# Patient Record
Sex: FEMALE | Race: WHITE | URBAN - METROPOLITAN AREA
[De-identification: names, ages, dates, MRNs, and addresses within clinical notes are randomized per-mention and may not be internally consistent; named-entity substitution may affect disease eponyms.]

---

## 2017-01-05 ENCOUNTER — OFFICE VISIT (OUTPATIENT)
Dept: OPHTHALMOLOGY | Facility: CLINIC | Age: 77
End: 2017-01-05

## 2017-01-05 DIAGNOSIS — H52.13 MYOPIA, BILATERAL: ICD-10-CM

## 2017-01-05 DIAGNOSIS — H35.369 DRUSEN (DEGENERATIVE) OF RETINA, UNSPECIFIED LATERALITY: Primary | ICD-10-CM

## 2017-01-05 ASSESSMENT — VISUAL ACUITY
OD_CC: 20/15-3
OS_CC: 20/20
CORRECTION_TYPE: GLASSES
METHOD: SNELLEN - LINEAR

## 2017-01-05 ASSESSMENT — REFRACTION_MANIFEST
OD_SPHERE: -0.50
OS_ADD: +2.50
OD_ADD: +2.50
OS_SPHERE: -0.75
OS_AXIS: 010
OS_CYLINDER: +0.50
OD_CYLINDER: SPHERE

## 2017-01-05 ASSESSMENT — REFRACTION_WEARINGRX
OD_ADD: +2.50
OD_CYLINDER: +0.25
OS_ADD: +2.50
OD_AXIS: 090
OD_SPHERE: -0.75
OS_AXIS: 135
OS_SPHERE: -0.75
OS_CYLINDER: +0.25
SPECS_TYPE: PAL

## 2017-01-05 ASSESSMENT — SLIT LAMP EXAM - LIDS
COMMENTS: NORMAL
COMMENTS: NORMAL

## 2017-01-05 ASSESSMENT — CUP TO DISC RATIO
OD_RATIO: 0.35
OS_RATIO: 0.35

## 2017-01-05 ASSESSMENT — TONOMETRY
IOP_METHOD: ICARE
OS_IOP_MMHG: 18
OD_IOP_MMHG: 16

## 2017-01-05 ASSESSMENT — CONF VISUAL FIELD
METHOD: COUNTING FINGERS
OS_NORMAL: 1
OD_NORMAL: 1

## 2017-01-05 ASSESSMENT — EXTERNAL EXAM - RIGHT EYE: OD_EXAM: NORMAL

## 2017-01-05 ASSESSMENT — EXTERNAL EXAM - LEFT EYE: OS_EXAM: NORMAL

## 2017-01-05 NOTE — NURSING NOTE
Chief Complaints and History of Present Illnesses   Patient presents with     COMPREHENSIVE EYE EXAM     HPI    Affected eye(s):  Both   Symptoms:     Blurred vision (Comment: unsure if blurred vision is one eye or both)   Floaters (Comment: both eyes - increased)   No flashes      Duration:  1 year   Frequency:  Constant, Intermittent       Do you have eye pain now?:  No      Comments:  MAGDA Huffman 10:52 AM 01/05/2017

## 2017-01-05 NOTE — MR AVS SNAPSHOT
After Visit Summary   1/5/2017    Jennifer Champion    MRN: [id number]           Patient Information     Date Of Birth          1940        Visit Information        Provider Department      1/5/2017 10:40 AM Luís Arnold MD [facility]        Today's Diagnoses     Drusen (degenerative) of retina, unspecified laterality - Both Eyes    -  1     Myopia, bilateral            Follow-ups after your visit        Follow-up notes from your care team     Return in about 1 year (around 1/5/2018) for Complete Eye Exam.      Your next 10 appointments already scheduled     Jan 11, 2017 10:10 AM   JOSE Extremity with Leana Reed PT   [facility] ([facility]  )    [address]  [facility] [address] [phone]            Jan 12, 2017  1:15 PM   (Arrive by 1:00 PM)   NEW HIP with Odilon Wu MD   [facility] ([facility])    [address] [place] [address] [phone]              Who to contact     Please call your clinic at [phone] to:    Ask questions about your health    Make or cancel appointments    Discuss your medicines    Learn about your test results    Speak to your doctor   If you have compliments or concerns about an experience at your clinic, or if you wish to file a complaint, please contact [facility] Patient Relations at [phone] or email us at [email or web address]         Additional Information About Your Visit        MyChart Information     Screenhero is an electronic gateway that provides easy, online access to your medical records. With Screenhero, you can request a clinic appointment, read your test results, renew a prescription or communicate with your care team.     To sign up for [employer] visit the website at [email or web address]   You will be asked to enter the  access code listed below, as well as some personal information. Please follow the directions to create your username and password.     Your access code is: [email or web address]  Expires: 3/22/2017  2:29 PM     Your access code will  in 90 days. If you need help or a new code, please contact your [facility] or call [phone] for assistance.        Care EveryWhere ID     This is your Care EveryWhere ID. This could be used by other organizations to access your [place] medical records  [id number]         Blood Pressure from Last 3 Encounters:   12 117/74    Weight from Last 3 Encounters:   No data found for Wt              Today, you had the following     No orders found for display       Primary Care Provider Office Phone # Fax #    Lizeth CASTILLO Camp [phone]       [facility] [address] [place] [id number]        Thank you!     Thank you for choosing [facility]  for your care. Our goal is always to provide you with excellent care. Hearing back from our patients is one way we can continue to improve our services. Please take a few minutes to complete the written survey that you may receive in the mail after your visit with us. Thank you!             Your Updated Medication List - Protect others around you: Learn how to safely use, store and throw away your medicines at www.disposemymeds.org.          This list is accurate as of: 17 11:57 AM.  Always use your most recent med list.                   Brand Name Dispense Instructions for use    acetaminophen 500 MG tablet    TYLENOL    100 tablet    Take 2 tablets by mouth 3 times daily.       ALEVE PO      Take by mouth as needed       LORazepam 0.5 MG tablet    ATIVAN     Take 0.5 mg by mouth At Bedtime       traMADol 50 MG tablet    ULTRAM    30 tablet    Take 1 tablet by mouth every 6 hours as needed.       TRAZODONE HCL PO      Take 6.25 mg by mouth nightly as needed.  Takes 1/4 or 25 mg tablet qHS PRN for sleep.       ZOLOFT PO      Take 50 mg by mouth daily

## 2017-01-05 NOTE — PROGRESS NOTES
Assessment & Plan      Jennifer Champion is a 76 year old female with the following diagnoses:   (H35.369) Drusen (degenerative) of retina, unspecified laterality - Both Eyes  (primary encounter diagnosis)  Comment: Mild  Plan: Follow, AREDS-2 q day    (H52.13) Myopia, bilateral  Comment: Little change  Plan: Rx     -----------------------------------------------------------------------------------      Patient disposition:   Return in about 1 year (around 1/5/2018) for Complete Eye Exam. or sooner as needed.    I have confirmed the content of the chief complaint, history of present illness, review of systems, and past medical/surgical history sections and edited the information as needed.

## 2017-01-12 ENCOUNTER — OFFICE VISIT (OUTPATIENT)
Dept: ORTHOPEDICS | Facility: CLINIC | Age: 77
End: 2017-01-12

## 2017-01-12 VITALS — BODY MASS INDEX: 26.48 KG/M2 | HEIGHT: 67 IN | WEIGHT: 168.7 LBS

## 2017-01-12 DIAGNOSIS — T56.894A TOXIC EFFECT OF OTHER METALS, UNDETERMINED, INITIAL ENCOUNTER: ICD-10-CM

## 2017-01-12 DIAGNOSIS — T84.84XA PAIN DUE TO HIP JOINT PROSTHESIS, INITIAL ENCOUNTER (H): ICD-10-CM

## 2017-01-12 DIAGNOSIS — T56.2X4A TOXIC EFFECT OF CHROMIUM AND ITS COMPOUNDS, UNDETERMINED, INITIAL ENCOUNTER: ICD-10-CM

## 2017-01-12 DIAGNOSIS — T84.84XA PAIN DUE TO HIP JOINT PROSTHESIS, INITIAL ENCOUNTER (H): Primary | ICD-10-CM

## 2017-01-12 DIAGNOSIS — Z96.649 PAIN DUE TO HIP JOINT PROSTHESIS, INITIAL ENCOUNTER (H): Primary | ICD-10-CM

## 2017-01-12 DIAGNOSIS — Z96.649 PAIN DUE TO HIP JOINT PROSTHESIS, INITIAL ENCOUNTER (H): ICD-10-CM

## 2017-01-12 LAB
CRP SERPL-MCNC: <2.9 MG/L (ref 0–8)
ERYTHROCYTE [SEDIMENTATION RATE] IN BLOOD BY WESTERGREN METHOD: 7 MM/H (ref 0–30)

## 2017-01-12 RX ORDER — AMOXICILLIN 500 MG/1
CAPSULE ORAL
COMMUNITY
Start: 2016-05-26

## 2017-01-12 RX ORDER — FLUTICASONE PROPIONATE 50 MCG
1 SPRAY, SUSPENSION (ML) NASAL
COMMUNITY
Start: 2016-12-19

## 2017-01-12 NOTE — Clinical Note
1/12/2017       RE: Jennifer Champion  [address]  [facility] [address]     Dear Colleague,    Thank you for referring your patient, Jennifer Champion, to the [facility] at [facility]. Please see a copy of my visit note below.    [facility], Orthopaedic Surgery Consultation  by Odilon Wu M.D.    Jennifer Champion MRN# [id number]   Age: 76 year old YOB: 1940     Requesting physician: Jaleel Crawford            Assessment and Plan:   Assessment:  Continued pain s/p R ASHLEY. Unclear etiology. Possibilities include indolent infection, loosening, abductor weakness/tear. Profoundly weak RLE abductors     Plan:  1. Inflammatory labs today. Will obtain Co/Cr/Ti serum levels to assess for trunion wear in the setting of large head on the right side  2. Patient will obtain prior XR and bone scans from [facility] for review  3. If inflammatory labs are elevated, will send for fluoro guided aspiration of the right hip. Aerobic/anaerobic/gram/cell/alpha-defensin  4. Follow up in clinic after workup           History of Present Illness:   76 year old female with multiply revised bilateral total hip arthroplasty. She is referred for her painful right total hip. She most recently underwent revision of a presumed loose right acetabulum component in 2012. She did relatively well until around 2014 when it started to worsen. No injury. The pain has fluctuated since then, but has been getting predictably worse in the last year. Feels better in certain positions. Painful over buttock. Painful moving in bed. Has tried therapy without relief. Always has had difficulty with single leg stance on the right and is very cane/crutch dependent. Left hip pain has resolved since revision surgery in July. She has not had an infectious workup.        Background history:  DX:  1. S/p bilateral total hip arthroplasty, acetabulum component loosening    TREATMENTS:  1. 1997,  R ASHLEY (Carlos Solis)  2. 1999, L ASHLEY (Carlos Solis)  3. 7/25/16, L hip revision ASHLEY for aseptic loosening. Biometregenerex 60mm cup, 3 screws, 36mm+0 head (PHILL Skinner)  4. 4/25/2012, revision R ASHLEY for aseptic loosening and migration of the acetabulum. Trilogy TM cup with 4 screws, 40mm +0 head. (PHILL Dyson)  5. 2007, revision acetabulum component R hip (Samm Arias)          Current symptoms:  Problem: right hip pain, s/p multiple revisions  Onset and duration: 1-2 years  Awakens from sleep due to sx's:  No  Precipitating Injury:  No    Other joints or sites painful:  Yes  Fever: No  Appetite change or weight loss: No           Physical Exam:     EXAMINATION pertinent findings:   VITAL SIGNS: There were no vitals taken for this visit.  There is no height or weight on file to calculate BMI.  RESP: non labored breathing   ABD: benign   SKIN: grossly normal   LYMPHATIC: grossly normal   NEURO: grossly normal   VASCULAR: satisfactory perfusion of all extremities   MUSCULOSKELETAL:   Trendelenberg, waddling gait    LLE:  Motion, log roll, flexion/extension w/o pain  5/5 abductor strength    RLE:  Able to perform SLR, pain with resisted motion  Flexion 90, ER 60, IR30 without pain terminally  2/5 abductor strength, reproduces pain  Posterior incision well healed, no swelling or redness, no drainage               Data:   All laboratory data reviewed  All imaging studies reviewed by me      HISTORY:  76-year-old female.  Bilateral hip replacements.  Hip pain.    Technique:  24.3 millicuries of technetium-99m-MDP was injected intravenously.  Delayed images of the skeleton were obtained.    Comparison:  Previous bone scan of 03/17/2015 and plain x-rays of the pelvis of 04/25/2012.    Findings:  The blood flow and blood pool images are unremarkable.    The delayed images demonstrate mildly increased uptake adjacent to the acetabular components bilaterally.  This is approximately unchanged from the 03/17/2015  bone scan.  This is nonspecific.    The right femoral prosthetic component appears normal with no evidence of loosening or infection.    The left femoral component demonstrates mildly increased uptake projecting over the left femoral neck and head in the anterior projection.  This is new compared to the 03/17/2015 bone scan but is nonspecific.  The left femoral prosthetic component appears otherwise unremarkable.    No other significant abnormalities are seen.    Impression:   1. There is mildly increased uptake adjacent to both acetabular components on the delayed images.  This is similar to the 03/17/2015 bone scan.  This is nonspecific.   2. There is mildly increased uptake projecting over the left femoral neck and head.  This is new compared to the 03/17/2015 bone scan but is also nonspecific.   3. There is no other evidence of loosening or infection.   4. Recommend further evaluation/followup as clinically indicated.    Dictated by David Sellers MD @ Jun 29 2016  3:13PM    (Electronically Signed)         Odilon Wu MD  [facility] Family Professor  Oncology and Adult Reconstructive Surgery  Dept Orthopaedic Surgery, [facility]  [phone] office, [phone] pager  [email or web address]            DATA for DOCUMENTATION:         Past Medical History:     Patient Active Problem List   Diagnosis     Left shoulder pain     Hip pain, right     Anxiety state     Loosening of prosthetic hip (H)     Disorder of bone and cartilage     Headache     History of total hip replacement     Insomnia     Low back pain     Osteoarthritis of hip     Disorder of esophagus     Atrophic vaginitis     Pain in right hip     Encounter for general adult medical examination without abnormal findings     Dyspnea on exertion     Throat clearing     Vitamin D deficiency     Past Medical History   Diagnosis Date     Insomnia      Anxiety state 8/7/2012       Also see scanned health assessment forms.       Past Surgical History:      Past Surgical History   Procedure Laterality Date     Joint replacement       bilateral      Phacoemulsification clear cornea with standard intraocular lens implant Bilateral 3/14 4/14     RE/LE            Social History:     Social History     Social History     Marital Status:      Spouse Name: N/A     Number of Children: N/A     Years of Education: N/A     Occupational History     Not on file.     Social History Main Topics     Smoking status: Former Smoker     Smokeless tobacco: Not on file     Alcohol Use: Not on file     Drug Use: Not on file     Sexual Activity: Not on file     Other Topics Concern     Not on file     Social History Narrative            Family History:       Family History   Problem Relation Age of Onset     Macular Degeneration Mother      Glaucoma No family hx of             Medications:     Current Outpatient Prescriptions   Medication Sig     omeprazole (PRILOSEC) 20 MG CR capsule      fluticasone (FLONASE) 50 MCG/ACT spray 1 spray     amoxicillin (AMOXIL) 500 MG capsule      Sertraline HCl (ZOLOFT PO) Take 50 mg by mouth daily     Naproxen Sodium (ALEVE PO) Take by mouth as needed     LORazepam (ATIVAN) 0.5 MG tablet Take 0.5 mg by mouth At Bedtime     acetaminophen (TYLENOL) 500 MG tablet Take 2 tablets by mouth 3 times daily.     tramadol (ULTRAM) 50 MG tablet Take 1 tablet by mouth every 6 hours as needed.     TRAZODONE HCL PO Take 6.25 mg by mouth nightly as needed. Takes 1/4 or 25 mg tablet qHS PRN for sleep.       No current facility-administered medications for this visit.              Review of Systems:   A comprehensive 10 point review of systems (constitutional, ENT, cardiac, peripheral vascular, lymphatic, respiratory, GI, , Musculoskeletal, skin, Neurological) was performed and found to be negative except as described in this note.     See intake form completed by patient    Again, thank you for allowing me to participate in the care of your patient.       Sincerely,    Odilon Wu MD

## 2017-01-12 NOTE — Clinical Note
1/12/2017      RE: Jennifer Champion  [address]  [facility] [address]       [facility], Orthopaedic Surgery Consultation  by Odilon Wu M.D.    Jennifer Cahmpion MRN# [id number]   Age: 76 year old YOB: 1940     Requesting physician: Jaleel Crawford            Assessment and Plan:   Assessment:  Continued pain s/p R ASHLEY. Unclear etiology. Possibilities include indolent infection, loosening, abductor weakness/tear. Profoundly weak RLE abductors     Plan:  1. Inflammatory labs today. Will obtain Co/Cr/Ti serum levels to assess for trunion wear in the setting of large head on the right side  2. Patient will obtain prior XR and bone scans from [facility] for review  3. If inflammatory labs are elevated, will send for fluoro guided aspiration of the right hip. Aerobic/anaerobic/gram/cell/alpha-defensin  4. Follow up in clinic after workup           History of Present Illness:   76 year old female with multiply revised bilateral total hip arthroplasty. She is referred for her painful right total hip. She most recently underwent revision of a presumed loose right acetabulum component in 2012. She did relatively well until around 2014 when it started to worsen. No injury. The pain has fluctuated since then, but has been getting predictably worse in the last year. Feels better in certain positions. Painful over buttock. Painful moving in bed. Has tried therapy without relief. Always has had difficulty with single leg stance on the right and is very cane/crutch dependent. Left hip pain has resolved since revision surgery in July. She has not had an infectious workup.        Background history:  DX:  1. S/p bilateral total hip arthroplasty, acetabulum component loosening    TREATMENTS:  1. 1997, R ASHLEY (Carlos Solis)  2. 1999, L ASHLEY (Carlos Solis)  3. 7/25/16, L hip revision ASHLEY for aseptic loosening. Biometregenerex 60mm cup, 3 screws, 36mm+0 head (Jaleel Crawford, [facility])  4. 4/25/2012, revision R ASHLEY  for aseptic loosening and migration of the acetabulum. Trilogy TM cup with 4 screws, 40mm +0 head. (Samm Arias, [facility])  5. 2007, revision acetabulum component R hip (Samm Arias)          Current symptoms:  Problem: right hip pain, s/p multiple revisions  Onset and duration: 1-2 years  Awakens from sleep due to sx's:  No  Precipitating Injury:  No    Other joints or sites painful:  Yes  Fever: No  Appetite change or weight loss: No           Physical Exam:     EXAMINATION pertinent findings:   VITAL SIGNS: There were no vitals taken for this visit.  There is no height or weight on file to calculate BMI.  RESP: non labored breathing   ABD: benign   SKIN: grossly normal   LYMPHATIC: grossly normal   NEURO: grossly normal   VASCULAR: satisfactory perfusion of all extremities   MUSCULOSKELETAL:   Trendelenberg, waddling gait    LLE:  Motion, log roll, flexion/extension w/o pain  5/5 abductor strength    RLE:  Able to perform SLR, pain with resisted motion  Flexion 90, ER 60, IR30 without pain terminally  2/5 abductor strength, reproduces pain  Posterior incision well healed, no swelling or redness, no drainage               Data:   All laboratory data reviewed  All imaging studies reviewed by me      HISTORY:  76-year-old female.  Bilateral hip replacements.  Hip pain.    Technique:  24.3 millicuries of technetium-99m-MDP was injected intravenously.  Delayed images of the skeleton were obtained.    Comparison:  Previous bone scan of 03/17/2015 and plain x-rays of the pelvis of 04/25/2012.    Findings:  The blood flow and blood pool images are unremarkable.    The delayed images demonstrate mildly increased uptake adjacent to the acetabular components bilaterally.  This is approximately unchanged from the 03/17/2015 bone scan.  This is nonspecific.    The right femoral prosthetic component appears normal with no evidence of loosening or infection.    The left femoral component demonstrates mildly increased uptake  projecting over the left femoral neck and head in the anterior projection.  This is new compared to the 03/17/2015 bone scan but is nonspecific.  The left femoral prosthetic component appears otherwise unremarkable.    No other significant abnormalities are seen.    Impression:   1. There is mildly increased uptake adjacent to both acetabular components on the delayed images.  This is similar to the 03/17/2015 bone scan.  This is nonspecific.   2. There is mildly increased uptake projecting over the left femoral neck and head.  This is new compared to the 03/17/2015 bone scan but is also nonspecific.   3. There is no other evidence of loosening or infection.   4. Recommend further evaluation/followup as clinically indicated.    Dictated by David Sellers MD @ Jun 29 2016  3:13PM    (Electronically Signed)         Odilon Wu MD  [facility] Family Professor  Oncology and Adult Reconstructive Surgery  Dept Orthopaedic Surgery, [facility]  [phone] office, [phone] pager  [email or web address]            DATA for DOCUMENTATION:         Past Medical History:     Patient Active Problem List   Diagnosis     Left shoulder pain     Hip pain, right     Anxiety state     Loosening of prosthetic hip (H)     Disorder of bone and cartilage     Headache     History of total hip replacement     Insomnia     Low back pain     Osteoarthritis of hip     Disorder of esophagus     Atrophic vaginitis     Pain in right hip     Encounter for general adult medical examination without abnormal findings     Dyspnea on exertion     Throat clearing     Vitamin D deficiency     Past Medical History   Diagnosis Date     Insomnia      Anxiety state 8/7/2012       Also see scanned health assessment forms.       Past Surgical History:     Past Surgical History   Procedure Laterality Date     Joint replacement       bilateral      Phacoemulsification clear cornea with standard intraocular lens implant Bilateral 3/14 4/14     RE/LE             Social History:     Social History     Social History     Marital Status:      Spouse Name: N/A     Number of Children: N/A     Years of Education: N/A     Occupational History     Not on file.     Social History Main Topics     Smoking status: Former Smoker     Smokeless tobacco: Not on file     Alcohol Use: Not on file     Drug Use: Not on file     Sexual Activity: Not on file     Other Topics Concern     Not on file     Social History Narrative            Family History:       Family History   Problem Relation Age of Onset     Macular Degeneration Mother      Glaucoma No family hx of             Medications:     Current Outpatient Prescriptions   Medication Sig     omeprazole (PRILOSEC) 20 MG CR capsule      fluticasone (FLONASE) 50 MCG/ACT spray 1 spray     amoxicillin (AMOXIL) 500 MG capsule      Sertraline HCl (ZOLOFT PO) Take 50 mg by mouth daily     Naproxen Sodium (ALEVE PO) Take by mouth as needed     LORazepam (ATIVAN) 0.5 MG tablet Take 0.5 mg by mouth At Bedtime     acetaminophen (TYLENOL) 500 MG tablet Take 2 tablets by mouth 3 times daily.     tramadol (ULTRAM) 50 MG tablet Take 1 tablet by mouth every 6 hours as needed.     TRAZODONE HCL PO Take 6.25 mg by mouth nightly as needed. Takes 1/4 or 25 mg tablet qHS PRN for sleep.       No current facility-administered medications for this visit.              Review of Systems:   A comprehensive 10 point review of systems (constitutional, ENT, cardiac, peripheral vascular, lymphatic, respiratory, GI, , Musculoskeletal, skin, Neurological) was performed and found to be negative except as described in this note.     See intake form completed by patient            Odilon Wu MD

## 2017-01-12 NOTE — PROGRESS NOTES
[facility], Orthopaedic Surgery Consultation  by Odilon Wu M.D.    Jennifer Champion MRN# [id number]   Age: 76 year old YOB: 1940     Requesting physician: Jaleel Crawford            Assessment and Plan:   Assessment:  Continued pain s/p R ASHLEY. Unclear etiology. Possibilities include indolent infection, loosening, abductor weakness/tear. Profoundly weak RLE abductors     Plan:  1. Inflammatory labs today. Will obtain Co/Cr/Ti serum levels to assess for trunion wear in the setting of large head on the right side  2. Patient will obtain prior XR and bone scans from [facility] for review  3. If inflammatory labs are elevated, will send for fluoro guided aspiration of the right hip. Aerobic/anaerobic/gram/cell/alpha-defensin  4. Follow up in clinic after workup           History of Present Illness:   76 year old female with multiply revised bilateral total hip arthroplasty. She is referred for her painful right total hip. She most recently underwent revision of a presumed loose right acetabulum component in 2012. She did relatively well until around 2014 when it started to worsen. No injury. The pain has fluctuated since then, but has been getting predictably worse in the last year. Feels better in certain positions. Painful over buttock. Painful moving in bed. Has tried therapy without relief. Always has had difficulty with single leg stance on the right and is very cane/crutch dependent. Left hip pain has resolved since revision surgery in July. She has not had an infectious workup.        Background history:  DX:  1. S/p bilateral total hip arthroplasty, acetabulum component loosening    TREATMENTS:  1. 1997, R ASHLEY (Carlos Solis)  2. 1999, L ASHLEY (Carlos Solis)  3. 7/25/16, L hip revision ASHLEY for aseptic loosening. Biometregenerex 60mm cup, 3 screws, 36mm+0 head (Jaleel Crawford, [facility])  4. 4/25/2012, revision R ASHLEY for aseptic loosening and migration of the acetabulum. Trilogy TM cup with 4 screws,  40mm +0 head. (Samm Arias, [facility])  5. 2007, revision acetabulum component R hip (Samm Arias)          Current symptoms:  Problem: right hip pain, s/p multiple revisions  Onset and duration: 1-2 years  Awakens from sleep due to sx's:  No  Precipitating Injury:  No    Other joints or sites painful:  Yes  Fever: No  Appetite change or weight loss: No           Physical Exam:     EXAMINATION pertinent findings:   VITAL SIGNS: There were no vitals taken for this visit.  There is no height or weight on file to calculate BMI.  RESP: non labored breathing   ABD: benign   SKIN: grossly normal   LYMPHATIC: grossly normal   NEURO: grossly normal   VASCULAR: satisfactory perfusion of all extremities   MUSCULOSKELETAL:   Trendelenberg, waddling gait    LLE:  Motion, log roll, flexion/extension w/o pain  5/5 abductor strength    RLE:  Able to perform SLR, pain with resisted motion  Flexion 90, ER 60, IR30 without pain terminally  2/5 abductor strength, reproduces pain  Posterior incision well healed, no swelling or redness, no drainage               Data:   All laboratory data reviewed  All imaging studies reviewed by me      HISTORY:  76-year-old female.  Bilateral hip replacements.  Hip pain.    Technique:  24.3 millicuries of technetium-99m-MDP was injected intravenously.  Delayed images of the skeleton were obtained.    Comparison:  Previous bone scan of 03/17/2015 and plain x-rays of the pelvis of 04/25/2012.    Findings:  The blood flow and blood pool images are unremarkable.    The delayed images demonstrate mildly increased uptake adjacent to the acetabular components bilaterally.  This is approximately unchanged from the 03/17/2015 bone scan.  This is nonspecific.    The right femoral prosthetic component appears normal with no evidence of loosening or infection.    The left femoral component demonstrates mildly increased uptake projecting over the left femoral neck and head in the anterior projection.  This is new  compared to the 03/17/2015 bone scan but is nonspecific.  The left femoral prosthetic component appears otherwise unremarkable.    No other significant abnormalities are seen.    Impression:   1. There is mildly increased uptake adjacent to both acetabular components on the delayed images.  This is similar to the 03/17/2015 bone scan.  This is nonspecific.   2. There is mildly increased uptake projecting over the left femoral neck and head.  This is new compared to the 03/17/2015 bone scan but is also nonspecific.   3. There is no other evidence of loosening or infection.   4. Recommend further evaluation/followup as clinically indicated.    Dictated by David Sellers MD @ Jun 29 2016  3:13PM    (Electronically Signed)         Odilon Wu MD  [facility] Family Professor  Oncology and Adult Reconstructive Surgery  Dept Orthopaedic Surgery, [facility]  [phone] office, [phone] pager  [email or web address]            DATA for DOCUMENTATION:         Past Medical History:     Patient Active Problem List   Diagnosis     Left shoulder pain     Hip pain, right     Anxiety state     Loosening of prosthetic hip (H)     Disorder of bone and cartilage     Headache     History of total hip replacement     Insomnia     Low back pain     Osteoarthritis of hip     Disorder of esophagus     Atrophic vaginitis     Pain in right hip     Encounter for general adult medical examination without abnormal findings     Dyspnea on exertion     Throat clearing     Vitamin D deficiency     Past Medical History   Diagnosis Date     Insomnia      Anxiety state 8/7/2012       Also see scanned health assessment forms.       Past Surgical History:     Past Surgical History   Procedure Laterality Date     Joint replacement       bilateral      Phacoemulsification clear cornea with standard intraocular lens implant Bilateral 3/14 4/14     RE/LE            Social History:     Social History     Social History     Marital Status:       Spouse Name: N/A     Number of Children: N/A     Years of Education: N/A     Occupational History     Not on file.     Social History Main Topics     Smoking status: Former Smoker     Smokeless tobacco: Not on file     Alcohol Use: Not on file     Drug Use: Not on file     Sexual Activity: Not on file     Other Topics Concern     Not on file     Social History Narrative            Family History:       Family History   Problem Relation Age of Onset     Macular Degeneration Mother      Glaucoma No family hx of             Medications:     Current Outpatient Prescriptions   Medication Sig     omeprazole (PRILOSEC) 20 MG CR capsule      fluticasone (FLONASE) 50 MCG/ACT spray 1 spray     amoxicillin (AMOXIL) 500 MG capsule      Sertraline HCl (ZOLOFT PO) Take 50 mg by mouth daily     Naproxen Sodium (ALEVE PO) Take by mouth as needed     LORazepam (ATIVAN) 0.5 MG tablet Take 0.5 mg by mouth At Bedtime     acetaminophen (TYLENOL) 500 MG tablet Take 2 tablets by mouth 3 times daily.     tramadol (ULTRAM) 50 MG tablet Take 1 tablet by mouth every 6 hours as needed.     TRAZODONE HCL PO Take 6.25 mg by mouth nightly as needed. Takes 1/4 or 25 mg tablet qHS PRN for sleep.       No current facility-administered medications for this visit.              Review of Systems:   A comprehensive 10 point review of systems (constitutional, ENT, cardiac, peripheral vascular, lymphatic, respiratory, GI, , Musculoskeletal, skin, Neurological) was performed and found to be negative except as described in this note.     See intake form completed by patient

## 2017-01-12 NOTE — NURSING NOTE
"Reason For Visit:   Chief Complaint   Patient presents with     Consult     Pt states that she is here today for Painful Right ASHLEY done by Dr. Carlos Solis in 1997. She states that she had 2 more revisions (2007 and 2012) done by Dr. Samm Arias. Referring:  DEVANG LORA           Ht 1.689 m (5' 6.5\")  Wt 76.522 kg (168 lb 11.2 oz)  BMI 26.82 kg/m2                    Current Outpatient Prescriptions   Medication     omeprazole (PRILOSEC) 20 MG CR capsule     fluticasone (FLONASE) 50 MCG/ACT spray     amoxicillin (AMOXIL) 500 MG capsule     Ibuprofen (ADVIL PO)     Sertraline HCl (ZOLOFT PO)     Naproxen Sodium (ALEVE PO)     LORazepam (ATIVAN) 0.5 MG tablet     acetaminophen (TYLENOL) 500 MG tablet     TRAZODONE HCL PO     No current facility-administered medications for this visit.       Allergies   Allergen Reactions     Codeine Nausea     Per pt felt Dizzy as well     Codeine Sulfate      Zolpidem      Feels bad on it ....... Kimberley Avitia RN 9/4/2009 9:45 AM       Peg Martinez C.M.A.               "

## 2017-01-13 LAB
IL6 SERPL-MCNC: 2.18 PG/ML
MISCELLANEOUS TEST: NORMAL

## 2017-01-14 LAB — COBALT SERPL-MCNC: NORMAL UG/L

## 2017-01-16 LAB — CR SERPL-MCNC: NORMAL NG/ML

## 2017-01-18 LAB
RESULT: NORMAL
SEND OUTS MISC TEST CODE: NORMAL
SEND OUTS MISC TEST SPECIMEN: NORMAL
TEST NAME: NORMAL

## 2017-02-02 ENCOUNTER — OFFICE VISIT (OUTPATIENT)
Dept: ORTHOPEDICS | Facility: CLINIC | Age: 77
End: 2017-02-02

## 2017-02-02 VITALS — BODY MASS INDEX: 26.07 KG/M2 | HEIGHT: 67 IN | WEIGHT: 166.1 LBS

## 2017-02-02 DIAGNOSIS — T84.84XD PAIN DUE TO HIP JOINT PROSTHESIS, SUBSEQUENT ENCOUNTER: ICD-10-CM

## 2017-02-02 DIAGNOSIS — E55.9 VITAMIN D DEFICIENCY: ICD-10-CM

## 2017-02-02 DIAGNOSIS — T84.84XD PAIN DUE TO HIP JOINT PROSTHESIS, SUBSEQUENT ENCOUNTER: Primary | ICD-10-CM

## 2017-02-02 DIAGNOSIS — Z96.649 MECHANICAL COMPLICATION OF PROSTHETIC HIP IMPLANT, SUBSEQUENT ENCOUNTER: Primary | ICD-10-CM

## 2017-02-02 DIAGNOSIS — Z96.649 PAIN DUE TO HIP JOINT PROSTHESIS, SUBSEQUENT ENCOUNTER: ICD-10-CM

## 2017-02-02 DIAGNOSIS — T84.098D MECHANICAL COMPLICATION OF PROSTHETIC HIP IMPLANT, SUBSEQUENT ENCOUNTER: Primary | ICD-10-CM

## 2017-02-02 DIAGNOSIS — Z96.649 PAIN DUE TO HIP JOINT PROSTHESIS, SUBSEQUENT ENCOUNTER: Primary | ICD-10-CM

## 2017-02-02 LAB
ALP SERPL-CCNC: 90 U/L (ref 40–150)
BUN SERPL-MCNC: 26 MG/DL (ref 7–30)
CALCIUM SERPL-MCNC: 9.2 MG/DL (ref 8.5–10.1)
CREAT SERPL-MCNC: 0.65 MG/DL (ref 0.52–1.04)
DEPRECATED CALCIDIOL+CALCIFEROL SERPL-MC: 22 UG/L (ref 20–75)
GFR SERPL CREATININE-BSD FRML MDRD: 89 ML/MIN/1.7M2
PHOSPHATE SERPL-MCNC: 3.7 MG/DL (ref 2.5–4.5)
PTH-INTACT SERPL-MCNC: 43 PG/ML (ref 12–72)

## 2017-02-02 NOTE — PROGRESS NOTES
[facility], Orthopaedic Surgery Consultation  by Odilon Wu M.D.    Jennifer Champion  MRN# [id number]    Age: 76 year old  YOB: 1940        Requesting physician:  PHILL Weinstein [facility]              Assessment and Plan:    Assessment:  1. Continued pain s/p R ASHLEY. Unclear etiology.  No evidence of infection. I am suspicious of aseptic loosening.  She had prior resorption of acetabular bone graft after 1st cup revision.  R/o metabolic bone disease.         2. Cup presently not fully seated with rim fixation as seen on CT.  Subtle evidence of cup abduction angle shift.  It has changed from 146 deg  on postop XR 4/25/2012 to 132 deg on XR 11/3/2016.  I suspect initial fixation was stable resulting in her sx's improvement but as loosening and cup position change has occurred due to failure of biologic ingrowth, she has now developed pain.  3. Also has profoundly weak RLE abductors.  I suspect this will not improve after surgery if it is related to denervation and she may need to use a cane on a permanent basis.        Plan:  1. Obtain metabolic bone screening labs.  Has hx of vit D deficiency many years ago, took vit D for a few years. Does not take Ca or Vit D now.  2. Then, consider eventual cup revision using HA coated implant with cemented cup as backup procedure.             History of Present Illness:      Returns to review lab testing noted below.    Background history:    76 year old female with multiply revised bilateral total hip arthroplasty. She is referred for her painful right total hip. She most recently underwent revision of a presumed loose right acetabulum component in 2012. She did relatively well until around 2014 when it started to worsen. No injury. The pain has fluctuated since then, but has been getting predictably worse in the last year. Feels better in certain positions. Painful over buttock. Painful moving in bed. Has tried therapy without  relief. Always has had difficulty with single leg stance on the right and is very cane/crutch dependent. Left hip pain has resolved since revision surgery in July.    DX:  1. 2012, S/p bilateral total hip arthroplasty, acetabulum component loosening  2. 2016, Painful R ASHLEY     TREATMENTS:  1. 1997, R ASHLEY (Carlos Solis)  2. 1999, L ASHLEY (Carlos Solis)  3. 2007, revision acetabulum component R hip (Samm Arias)  4. 4/25/2012, revision R ASHLEY for aseptic loosening and migration of the acetabulum. Trilogy TM cup with 4 screws, 40mm +0 head. (Samm Arias, [facility])  5. 7/25/16, L hip revision ASHLEY for aseptic loosening. Biomet regenerex 60mm cup, 3 screws, 36mm+0 head (Jaleel Crawford, [facility])    Results for DAVID RODRÍGUEZ (MRN [id number]) as of 2/2/2017 10:55   Ref. Range 1/12/2017 15:36   Chromium Unknown <1.0...   Ti <10 mcg/L 35 Elevated   [place]tuck Unknown <1.0...   CRP Inflammation Latest Ref Range: 0.0-8.0 mg/L <2.9   Interleukin 6 Blood Latest Ref Range: <3.01 pg/mL 2.18   Sed Rate Latest Ref Range: 0-30 mm/h 7       Odilon Wu MD  [facility] Professor  Oncology and Adult Reconstructive Surgery  Dept Orthopaedic Surgery, [facility]  [phone] office, [phone] pager  [email or web address]    Total Time = 25 min, 50% of which was spent in counseling and coordination of care as documented above.

## 2017-02-02 NOTE — Clinical Note
2/2/2017       RE: Jennifer Champion  [address]  [place] [address]     Dear Colleague,    Thank you for referring your patient, Jennifer Champion, to the [facility] at [facility] Please see a copy of my visit note below.         [facility], Orthopaedic Surgery Consultation  by Odilon Wu M.D.    Jennifer Champion  MRN# [id number]    Age: 76 year old  YOB: 1940        Requesting physician:  PHILL Weinstein [facility]              Assessment and Plan:    Assessment:  1. Continued pain s/p R ASHLEY. Unclear etiology.  No evidence of infection. I am suspicious of aseptic loosening.  She had prior resorption of acetabular bone graft after 1st cup revision.  R/o metabolic bone disease.         2. Cup presently not fully seated with rim fixation as seen on CT.  Subtle evidence of cup abduction angle shift.  It has changed from 146 deg  on postop XR 4/25/2012 to 132 deg on XR 11/3/2016.  I suspect initial fixation was stable resulting in her sx's improvement but as loosening and cup position change has occurred due to failure of biologic ingrowth, she has now developed pain.  3. Also has profoundly weak RLE abductors.  I suspect this will not improve after surgery if it is related to denervation and she may need to use a cane on a permanent basis.        Plan:  1. Obtain metabolic bone screening labs.  Has hx of vit D deficiency many years ago, took vit D for a few years. Does not take Ca or Vit D now.  2. Then, consider eventual cup revision using HA coated implant with cemented cup as backup procedure.             History of Present Illness:      Returns to review lab testing noted below.    Background history:    76 year old female with multiply revised bilateral total hip arthroplasty. She is referred for her painful right total hip. She most recently underwent revision of a presumed loose right acetabulum component in  2012. She did relatively well until around 2014 when it started to worsen. No injury. The pain has fluctuated since then, but has been getting predictably worse in the last year. Feels better in certain positions. Painful over buttock. Painful moving in bed. Has tried therapy without relief. Always has had difficulty with single leg stance on the right and is very cane/crutch dependent. Left hip pain has resolved since revision surgery in July.    DX:  1. 2012, S/p bilateral total hip arthroplasty, acetabulum component loosening  2. 2016, Painful R ASHLEY     TREATMENTS:  1. 1997, R ASHLEY (Carlos Solis)  2. 1999, L ASHLEY (Carlos Solis)  3. 2007, revision acetabulum component R hip (Samm Arias)  4. 4/25/2012, revision R ASHLEY for aseptic loosening and migration of the acetabulum. Trilogy TM cup with 4 screws, 40mm +0 head. (Samm Arias, [facility])  5. 7/25/16, L hip revision ASHLEY for aseptic loosening. Biomet regenerex 60mm cup, 3 screws, 36mm+0 head (Jaleel Crawford, [facility])    Results for DAVID RODRÍGUEZ (MRN [id number]) as of 2/2/2017 10:55   Ref. Range 1/12/2017 15:36   Chromium Unknown <1.0...   Ti <10 mcg/L 35 Elevated   [place]otte Unknown <1.0...   CRP Inflammation Latest Ref Range: 0.0-8.0 mg/L <2.9   Interleukin 6 Blood Latest Ref Range: <3.01 pg/mL 2.18   Sed Rate Latest Ref Range: 0-30 mm/h 7       Odilon Wu MD  [facility] Family Professor  Oncology and Adult Reconstructive Surgery  Dept Orthopaedic Surgery, [facility]  [phone] office, [phone] pager  [email or web address]    Total Time = 25 min, 50% of which was spent in counseling and coordination of care as documented above.          Again, thank you for allowing me to participate in the care of your patient.      Sincerely,    Odilon Wu MD

## 2017-02-02 NOTE — NURSING NOTE
"Reason For Visit:   Chief Complaint   Patient presents with     Results     F/U Painful Right ASHLEY. Labs were done on 1/12/17 after LV.          Ht 1.689 m (5' 6.5\")  Wt 75.342 kg (166 lb 1.6 oz)  BMI 26.41 kg/m2                    Current Outpatient Prescriptions   Medication     omeprazole (PRILOSEC) 20 MG CR capsule     fluticasone (FLONASE) 50 MCG/ACT spray     amoxicillin (AMOXIL) 500 MG capsule     Ibuprofen (ADVIL PO)     Sertraline HCl (ZOLOFT PO)     Naproxen Sodium (ALEVE PO)     LORazepam (ATIVAN) 0.5 MG tablet     acetaminophen (TYLENOL) 500 MG tablet     TRAZODONE HCL PO     No current facility-administered medications for this visit.       Allergies   Allergen Reactions     Codeine Nausea     Per pt felt Dizzy as well     Codeine Sulfate      Zolpidem      Feels bad on it ....... Kimberley Avitia RN 9/4/2009 9:45 AM       Peg Martinez C.M.A.               "

## 2018-07-17 ENCOUNTER — OFFICE VISIT (OUTPATIENT)
Dept: OPHTHALMOLOGY | Facility: CLINIC | Age: 78
End: 2018-07-17
Payer: COMMERCIAL

## 2018-07-17 DIAGNOSIS — H52.13 MYOPIA, BILATERAL: ICD-10-CM

## 2018-07-17 DIAGNOSIS — H35.369 DRUSEN (DEGENERATIVE) OF RETINA, UNSPECIFIED LATERALITY: Primary | ICD-10-CM

## 2018-07-17 ASSESSMENT — VISUAL ACUITY
OS_CC+: -2
OD_CC: 20/20
OD_CC: J1+
OS_CC: J1
CORRECTION_TYPE: GLASSES
METHOD: SNELLEN - LINEAR
OD_CC+: -2
OS_CC: 20/20

## 2018-07-17 ASSESSMENT — REFRACTION_WEARINGRX
OD_CYLINDER: SPHERE
OS_AXIS: 014
OS_SPHERE: -0.75
OS_CYLINDER: +0.50
OS_ADD: +2.50
SPECS_TYPE: PAL
OD_SPHERE: -0.50
OD_ADD: +2.50

## 2018-07-17 ASSESSMENT — REFRACTION_MANIFEST
OS_AXIS: 015
OD_CYLINDER: SPHERE
OD_ADD: +2.50
OS_SPHERE: -0.75
OS_ADD: +2.50
OD_SPHERE: -0.50
OS_CYLINDER: +0.50

## 2018-07-17 ASSESSMENT — SLIT LAMP EXAM - LIDS
COMMENTS: NORMAL
COMMENTS: NORMAL

## 2018-07-17 ASSESSMENT — CUP TO DISC RATIO
OD_RATIO: 0.35
OS_RATIO: 0.35

## 2018-07-17 ASSESSMENT — CONF VISUAL FIELD
OS_NORMAL: 1
OD_NORMAL: 1

## 2018-07-17 ASSESSMENT — TONOMETRY
OD_IOP_MMHG: 16
IOP_METHOD: TONOPEN
OS_IOP_MMHG: 17

## 2018-07-17 ASSESSMENT — EXTERNAL EXAM - LEFT EYE: OS_EXAM: NORMAL

## 2018-07-17 ASSESSMENT — EXTERNAL EXAM - RIGHT EYE: OD_EXAM: NORMAL

## 2018-07-17 NOTE — PROGRESS NOTES
HPI  Jennifer Champion is a 78 year old female here for comprehensive eye exam.  Denies blurry vision, eye pain, irritation, or redness.  Denies flashes but has longstanding floaters right eye more than left eye.  Notes glare around headlights sometimes.      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     -----------------------------------------------------------------------------------    Patient disposition:   Return in about 1 year (around 7/17/2019) for Comprehensive Exam. Call for sooner appointment as needed.    Complete documentation of historical and exam elements from today's encounter can be found in the full encounter summary report (not reduplicated in this progress note). I personally obtained the chief complaint(s) and history of present illness.  I have confirmed and edited as necessary the CC, HPI, PMH/PSH, social history, FMH, ROS, and exam/neuro findings as obtained by the technician or others. I have examined this patient myself and I personally viewed the image(s) and studies listed above and the documentation reflects my findings and interpretation.  I formulated and edited as necessary the assessment and plan and discussed  the findings and management plan with the patient and family.     Destini Malone MD

## 2018-07-17 NOTE — MR AVS SNAPSHOT
After Visit Summary   2018    Jennifer Champion    MRN: [id number]           Patient Information     Date Of Birth          1940        Visit Information        Provider Department      2018 2:40 PM Destini Malone MD [facility]        Today's Diagnoses     Drusen (degenerative) of retina, unspecified laterality - Both Eyes    -  1    Myopia, bilateral - Both Eyes           Follow-ups after your visit        Follow-up notes from your care team     Return in about 1 year (around 2019) for Comprehensive Exam.      Who to contact     Please call your clinic at [phone] to:    Ask questions about your health    Make or cancel appointments    Discuss your medicines    Learn about your test results    Speak to your doctor            Additional Information About Your Visit        MyChart Information     bubl is an electronic gateway that provides easy, online access to your medical records. With bubl, you can request a clinic appointment, read your test results, renew a prescription or communicate with your care team.     To sign up for Sentrixt visit the website at [email or web address]   You will be asked to enter the access code listed below, as well as some personal information. Please follow the directions to create your username and password.     Your access code is: [email or web address]  Expires: 10/4/2018  6:31 AM     Your access code will  in 90 days. If you need help or a new code, please contact your [facility] or call [phone] for assistance.        Care EveryWhere ID     This is your Care EveryWhere ID. This could be used by other organizations to access your [place] medical records  [id number]         Blood Pressure from Last 3 Encounters:   12 117/74    Weight from Last 3 Encounters:   17 75.3 kg (166 lb 1.6 oz)   17 76.5 kg (168 lb 11.2 oz)              Today, you  had the following     No orders found for display         Today's Medication Changes          These changes are accurate as of 7/17/18  3:58 PM.  If you have any questions, ask your nurse or doctor.               Stop taking these medicines if you haven't already. Please contact your care team if you have questions.     omeprazole 20 MG CR capsule   Commonly known as:  priLOSEC   Stopped by:  Destini Malone MD                    Primary Care Provider Office Phone # Fax #    Lizeth Camp [phone]       [facility] [address] [place] [address]        Equal Access to Services     [facility]: Hadii aad ku hadasho Soomaali, waaxda luqadaha, qaybta kaalmada adeegyada, branden etienne . So [facility] [phone].    ATENCIÓN: Si habla español, tiene a montero disposición servicios gratuitos de asistencia lingüística. LlUniversity [facility] [phone].    We comply with applicable federal civil rights laws and [place] laws. We do not discriminate on the basis of race, color, national origin, age, disability, sex, sexual orientation, or gender identity.            Thank you!     Thank you for choosing [facility]  for your care. Our goal is always to provide you with excellent care. Hearing back from our patients is one way we can continue to improve our services. Please take a few minutes to complete the written survey that you may receive in the mail after your visit with us. Thank you!             Your Updated Medication List - Protect others around you: Learn how to safely use, store and throw away your medicines at www.disposemymeds.org.          This list is accurate as of 7/17/18  3:58 PM.  Always use your most recent med list.                   Brand Name Dispense Instructions for use Diagnosis    acetaminophen 500 MG tablet    TYLENOL    100 tablet    Take 2 tablets by mouth 3 times daily.    Pelvic fracture (H), Right elbow pain        ADVIL PO      Take 1 tablet by mouth as needed    Pain due to hip joint prosthesis, initial encounter (H)       ALEVE PO      Take 1 tablet by mouth as needed        amoxicillin 500 MG capsule    AMOXIL     As directed before dental appts    Pain due to hip joint prosthesis, initial encounter (H)       fluticasone 50 MCG/ACT spray    FLONASE     Spray 1 spray in nostril    Pain due to hip joint prosthesis, initial encounter (H)       LORazepam 0.5 MG tablet    ATIVAN     Take 0.5 mg by mouth At Bedtime        TRAZODONE HCL PO      Take 0.5 mg by mouth nightly as needed        ZOLOFT PO      Take 50 mg by mouth daily

## 2019-07-08 ENCOUNTER — TELEPHONE (OUTPATIENT)
Dept: OPHTHALMOLOGY | Facility: CLINIC | Age: 79
End: 2019-07-08

## 2019-07-08 NOTE — TELEPHONE ENCOUNTER
[facility] Call Center    Phone Message    May a detailed message be left on voicemail: yes    Reason for Call: Other: per "[employer] in [facility]- needs pts recent glasses rx faxed to [phone] thanks!!     Action Taken: Message routed to:  [facility] eye

## 2019-07-08 NOTE — TELEPHONE ENCOUNTER
[facility] Call Center    Phone Message    May a detailed message be left on voicemail: yes    Reason for Call: Other: Agustina from [employer] in [facility] calling a 2x time for Rx for eye glasses from July 2018, Dr. Malone faxed to her at [phone]. If you need to call Agustina, call [phone]. Thank you!     Action Taken: Message routed to:  [facility] ([facility] EYE [facility]AL